# Patient Record
(demographics unavailable — no encounter records)

---

## 2025-03-26 NOTE — ASSESSMENT
[FreeTextEntry1] : 69 yo female presenting today with right pes cavus deformity, peroneal tendintis, plantar fasciitis. Recommend non-surgical managemnet -Discussed with patient that right plantar fascial CSI not recommend due to increased risk of calcaneal stress fractures due to thinning of calcaneal fat pad. Patient understands. -Rx plantar fasciitis night time splint given -Demonstrated plantar fascia stretching/strengthening exercises with patient -Discussed with patient that she is prone to these conditions to her pes cavus deformity, patient expressed understanding -Rx PT given today -Discussed risks and benefits of plantar fascial PRP injections. Discussed with patient that if conservative management fails that patient may be indicated for PRP. -F/u 6 weeks  The diagnosis was explained in detail. The potential non-surgical and surgical treatments were reviewed. The relative risks and benefits of each option were considered relative to the patients age, activity level, medical history, symptom severity and previously attempted treatments.   The patient was advised to consult with their primary medical provider prior to initiation of any new medications to reduce the risk of adverse effects specific to their long-term home medications and medical history.    The patient's current medications management of their orthopedic diagnosis was reviewed today:   1) We discussed a comprehensive treatment plan that included possible pharmaceutical management involving the use of prescription strength medications including but not limited to options as oral Naprosyn 500mg BID, once daily Meloxicam 15 mg, or 500-650 mg Tylenol versus over-the-counter oral medications and topical prescriptions NSAID Pennsaid vs over the counter Voltaren gel.  2) There is a moderate risk of morbidity with further treatment, especially from use of prescription strength medications and possible side effects of these medications which include upset stomach with oral medications, skin reactions to topical medications and cardiac/renal issues with long term use.  3) I recommended that the patient follow-up with their medical physician to discuss any significant specific potential issues with long term medication use such as interactions with current medications or with exacerbation of underlying medical comorbidities.  4) The benefits and risks associated with use of injectable, oral or topical, prescription and over the counter anti-inflammatory medications were discussed with the patient. The patient voiced understanding of the risks including but not limited to bleeding, stroke, kidney dysfunction, heart disease, and were referred to the black box warning label for further information  Entered by Tim Cobb PA-C acting as scribe. Dr. Luis Attestation The documentation recorded by the scribe, in my presence, accurately reflects the service I, Dr. Luis, personally performed, and the decisions made by me with my edits as appropriate.

## 2025-03-26 NOTE — HISTORY OF PRESENT ILLNESS
[8] : 8 [5] : 5 [Sharp] : sharp [Tingling] : tingling [Constant] : constant [Household chores] : household chores [Social interactions] : social interactions [Meds] : meds [Standing] : standing [Walking] : walking [Stairs] : stairs [Retired] : Work status: retired [Gradual] : gradual [de-identified] : Patient is here today complaining of right foot pain. No specific cause of injury, pain has been present for about 1 month. No XR imaging of her foot, nor prior treatment. Experiences a sharp pain lateral/ posterior when standing on it and twisting, soreness throughout the day. Reports pins and needles is present. Taking Advil PRN.  [] : Post Surgical Visit: no [FreeTextEntry1] : Right foot [FreeTextEntry3] : 1 month ago  [FreeTextEntry5] : NKI  [FreeTextEntry6] : soreness

## 2025-03-26 NOTE — PHYSICAL EXAM
[de-identified] : Examination of the right foot and ankle is as follows: INSPECTION: pes cavus alignment, but no abrasion, no laceration, no swelling, no erythema, no ecchymosis PALPATION: plantar fascia tenderness, plantar heels tenderness, peroneal tendon tenderness, but no lateral column tenderness ROM: dorsiflexion 20 degrees, plantar flexion 40 degrees, inversion 30 degrees, eversion 20 degrees STRENGTH: dorsiflexion 5/5. plantarflexion 5/5, inversion 5/5, eversion 5/5, EHL 5/5, FHL 5/5 VASCULAR: dorsalis pedis pulse: 2+, posterior tibialis pulse 2+ NEURO: sensation present to light touch in all distributions GAIT: mildly antalgic, but patient ambulates without assistive device.   X-rays of the right foot is as follows: Foot 3 View AP/Lateral/Oblique: pes cavus deformity, advanced hallux rigidus